# Patient Record
Sex: MALE | Race: ASIAN | URBAN - METROPOLITAN AREA
[De-identification: names, ages, dates, MRNs, and addresses within clinical notes are randomized per-mention and may not be internally consistent; named-entity substitution may affect disease eponyms.]

---

## 2024-06-06 ENCOUNTER — TELEPHONE (OUTPATIENT)
Age: 19
End: 2024-06-06

## 2024-06-06 NOTE — TELEPHONE ENCOUNTER
Raoul Chase,    I'm glad that we were able to connect today. Here are some therapy resources that may be a good fit for Maury. Please verify your insurance coverage with any providers that you may choose to call and schedule with directly. If you need further assistance, please give our office a call at [phone]. If you need more immediate assistance, or assistance outside of business hours, please contact the [facility] 24/7 helpline at [phone].    [facility]   [address], [address], [place], [address]  Phone: [phone]    Chandni (Radha) Alfred [facility]  [address], [place] [address]   Phone: [phone]    Mary Jane Crow MA, [facility]  [address], [place], [address]  Phone: [phone]    Liz Caro, PhD  [facility]  [address] [place] [address], [address], [place], [address]  Phone: [phone]    Josr Pink MA, [facility]  [facility]  [address], [address], [place], [address]  Phone: [phone]    Tara MILIAN LCSW (she/her/hers)  Patient Care Navigator - Mental Health  [facility]/Mental Health Division    [email or web address]  Request an assessment or support »